# Patient Record
Sex: FEMALE | HISPANIC OR LATINO | Employment: UNEMPLOYED | URBAN - METROPOLITAN AREA
[De-identification: names, ages, dates, MRNs, and addresses within clinical notes are randomized per-mention and may not be internally consistent; named-entity substitution may affect disease eponyms.]

---
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
## 2023-01-14 ENCOUNTER — HEALTH MAINTENANCE LETTER (OUTPATIENT)
Age: 18
End: 2023-01-14

## 2023-03-17 ENCOUNTER — TELEPHONE (OUTPATIENT)
Dept: PSYCHOLOGY | Facility: CLINIC | Age: 18
End: 2023-03-17
Payer: COMMERCIAL

## 2023-03-17 NOTE — TELEPHONE ENCOUNTER
Pre-Appointment Document Gathering    Intake Questions:  o Does your child have any existing medical conditions or prior hospitalizations? No   o Have they been evaluated in the past either by a clinician, mental health provider, or school? Dr. Arizmendi   o What are you looking for from this evaluation? Re-eval        Intake Screeening:    Appointment Type Placement: neuropsych eval     Wait time quote (if applicable): Scheduled immediately     Rationale/Notes:      Logistics:  Patient would like to receive their intake paperwork via [employer] (parent already has proxy access)    Email consent? yes    Will the family need an ? no    Intake Paperwork Documentation  Document  Date sent to family Date received and sent to scanning   [facility] Demographics 5/10/23    ROIs to Collect     ROIs/Consent to communicate as indicated by ROIs to Collect form 5/10/23    Medical History Did not send Patient is a former Dr. Ramirez patient and was last seen by Dr. Arizmendi on 7/27/2020   School and Intervention History 5/10/23    Behavioral and Mental Health History 5/10/23    Questionnaires (indicate type in the sent/received column) [] [facility] Parent Not currently sending for our adult patients    [] [facility] Teacher Not currently sending for our adult patients    [] [facility] Parent Not currently sending for our adult patients    [] [facility] Teacher Not currently sending for our adult patients    [] [facility] Parent Not currently sending for our adult patients    [] [facility] Teacher Not currently sending for our adult patients    [] Other:      Release of Information Collection / Records received  *If records received from a location without an VICENTE on file please still document receipt in this chart*  School/Service/Therapist/etc.  Family Returned signed VICENTE Sent Request Received/Sent to HIM scanning Where in the chart?

## 2023-04-23 ENCOUNTER — HEALTH MAINTENANCE LETTER (OUTPATIENT)
Age: 18
End: 2023-04-23

## 2023-06-13 NOTE — ED AVS SNAPSHOT
[facility], [place], Emergency Department  [address]  [place] [address]  Phone:  [phone]  Fax:  [phone]                                    Nellie Villarreal   MRN: [id number]    Department:  [facility], Emergency Department   Date of Visit:  1/10/2020           After Visit Summary Signature Page    I have received my discharge instructions, and my questions have been answered. I have discussed any challenges I see with this plan with the nurse or doctor.    ..........................................................................................................................................  Patient/Patient Representative Signature      ..........................................................................................................................................  Patient Representative Print Name and Relationship to Patient    ..................................................               ................................................  Date                                   Time    ..........................................................................................................................................  Reviewed by Signature/Title    ...................................................              ..............................................  Date                                               Time          22EPIC Rev 08/18       
8

## 2023-06-15 NOTE — TELEPHONE ENCOUNTER
LM with patient about paperwork, asked them to call clinic back with questions or concerns.    Shaista Herrera CMA

## 2023-09-24 ENCOUNTER — HEALTH MAINTENANCE LETTER (OUTPATIENT)
Age: 18
End: 2023-09-24